# Patient Record
Sex: FEMALE | Race: BLACK OR AFRICAN AMERICAN | URBAN - METROPOLITAN AREA
[De-identification: names, ages, dates, MRNs, and addresses within clinical notes are randomized per-mention and may not be internally consistent; named-entity substitution may affect disease eponyms.]

---

## 2018-09-14 ENCOUNTER — OFFICE VISIT (OUTPATIENT)
Dept: NEUROLOGY | Age: 23
End: 2018-09-14

## 2018-09-14 VITALS
RESPIRATION RATE: 12 BRPM | BODY MASS INDEX: 24.52 KG/M2 | SYSTOLIC BLOOD PRESSURE: 104 MMHG | OXYGEN SATURATION: 100 % | HEART RATE: 77 BPM | TEMPERATURE: 98.2 F | WEIGHT: 138.4 LBS | DIASTOLIC BLOOD PRESSURE: 74 MMHG | HEIGHT: 63 IN

## 2018-09-14 DIAGNOSIS — G44.039 EPISODIC PAROXYSMAL HEMICRANIA, NOT INTRACTABLE: Primary | ICD-10-CM

## 2018-09-14 DIAGNOSIS — M79.2 NEURALGIA: ICD-10-CM

## 2018-09-14 RX ORDER — OXCARBAZEPINE 300 MG/1
300 TABLET, FILM COATED ORAL 2 TIMES DAILY
Qty: 60 TAB | Refills: 3 | Status: SHIPPED | OUTPATIENT
Start: 2018-09-14

## 2018-09-14 NOTE — PROGRESS NOTES
Eloisa Johnson is a 23 y.o. female Chief Complaint Patient presents with  New Patient Pt seen at the request by Dr. Melany Ceballos  Headache Pt stated that it't mostly on the left side. Pt also stated that it comes and go. 1. Have you been to the ER, urgent care clinic since your last visit? Hospitalized since your last visit? No 
 
2. Have you seen or consulted any other health care providers outside of the [facility] since your last visit? Include any pap smears or colon screening.  No

## 2018-09-14 NOTE — MR AVS SNAPSHOT
[address] [facility] [id number] [phone] Patient: Ricardo Velez MRN: [id number] :1995 Visit Information Date & Time Provider Department Dept. Phone Encounter #  
 2018 10:00 AM [facility] [phone] [id number] Follow-up Instructions Return in about 7 weeks (around 2018). Upcoming Health Maintenance Date Due  
 HPV Age 9Y-34Y (1 of 1 - Female 3 Dose Series) 2004 DTaP/Tdap/Td series (1 - Tdap) 2014 PAP AKA CERVICAL CYTOLOGY 2014 Influenza Age 5 to Adult 2018 Allergies as of 2018  Review Complete On: 2018 By: Moisés Perez Not on File Current Immunizations  Never Reviewed No immunizations on file. Not reviewed this visit Vitals BP Pulse Temp Resp Height(growth percentile) Weight(growth percentile) 104/74 (BP 1 Location: Left arm, BP Patient Position: Sitting) 77 98.2 °F (36.8 °C) (Oral) 12 5' 3\" (1.6 m) 138 lb 6.4 oz (62.8 kg) LMP SpO2 BMI OB Status Smoking Status 2018 100% 24.52 kg/m2 Having regular periods Never Smoker Vitals History BMI and BSA Data Body Mass Index Body Surface Area 24.52 kg/m 2 1.67 m 2 Preferred Pharmacy Pharmacy Name Phone [facility] [address] [place] [address], [address] [place] [address] [phone] Your Updated Medication List  
  
   
This list is accurate as of 18 10:55 AM.  Always use your most recent med list. OXcarbazepine 300 mg tablet Commonly known as:  TRILEPTAL Take 1 Tab by mouth two (2) times a day. Take one tablet daily at bedtime for two days, and then take one tablet two times per day. Prescriptions Sent to Pharmacy Refills  OXcarbazepine (TRILEPTAL) 300 mg tablet 3  
 Sig: Take 1 Tab by mouth two (2) times a day. Take one tablet daily at bedtime for two days, and then take one tablet two times per day. Class: Normal  
 Pharmacy: [facility], [address] [place] [address] #: [phone] Route: Oral  
  
Follow-up Instructions Return in about 7 weeks (around 11/2/2018). Introducing [facility]! [facility] introduces [employer]Star patient portal. Now you can access parts of your medical record, email your doctor's office, and request medication refills online. 1. In your internet browser, go to [email or web address] 2. Click on the First Time User? Click Here link in the Sign In box. You will see the New Member Sign Up page. 3. Enter your StreamStar Access Code exactly as it appears below. You will not need to use this code after youve completed the sign-up process. If you do not sign up before the expiration date, you must request a new code. · StreamStar Access Code: [email or web address] Expires: 12/13/2018  9:43 AM 
 
4. Enter the last four digits of your Social Security Number (xxxx) and Date of Birth (mm/dd/yyyy) as indicated and click Submit. You will be taken to the next sign-up page. 5. Create a StreamStar ID. This will be your StreamStar login ID and cannot be changed, so think of one that is secure and easy to remember. 6. Create a StreamStar password. You can change your password at any time. 7. Enter your Password Reset Question and Answer. This can be used at a later time if you forget your password. 8. Enter your e-mail address. You will receive e-mail notification when new information is available in [address] 9. Click Sign Up. You can now view and download portions of your medical record. 10. Click the Download Summary menu link to download a portable copy of your medical information.  
 
If you have questions, please visit the Frequently Asked Questions section of the "[employer]". Remember, [employer] is NOT to be used for urgent needs. For medical emergencies, dial 911. Now available from your iPhone and Android! Please provide this summary of care documentation to your next provider. Your primary care clinician is listed as Cyndi Farrell. If you have any questions after today's visit, please call [phone].

## 2018-09-14 NOTE — PROGRESS NOTES
Ken We-07-A [address]  
[address] [facility] [facility] [address]  [phone] Fax Referring:  Dr. Maynor Astorga Chief Complaint Patient presents with  New Patient Pt seen at the request by Dr. Maynor Astorga  Headache Pt stated that it't mostly on the left side. Pt also stated that it comes and go. CC-  Patient presents for evaluation of intermittent headaches for the past 9 years. She is accompanied by her sister. HPI- Patient reports she started in about 2009 with intermittent headaches. At first, her headaches were about 6 times per year and lasted 5 seconds each. Her headaches have gradually increased to about 3 times per month and last 5-15 seconds each  Her headaches are usually on the left side of her head (parietal area), and rarely (about 2 times per year) they occur on the right side of her head again in the parietal area. Her headaches are localized and do not spread. They feel like a lightening bolt 10/10 for about 3 seconds,and then she feels a mild aching pain in the same location (she indicates an area about 4 cm diameter) for the next 5-15 seconds. She will occasionally feel mild nausea while her head hurts. About one year after she started having these headaches, she noticed a tender area on the top of her head about 3 cm diameter, which is still very tender to touch. She denies any vomiting, photophobia, phonophobia, vision changes, or aura. She denies any tingling or weakness. She denies any twitching, jerking, dizziness, vertigo, fainting or altered consciousness. She denies any difficulty swallowing or speaking. Since 2016, she feels intermittent numbness inside the left side of her head (same parietal area as where her head pain occurs), this occurs once per week and lasts about 4 hours.  She denies feeling any paresthesias on her scalp. When she has the head pain, it is a little difficult for her to concentrate for the duration of the pain (5-15 seconds), but denies any actual confusion during or after her headaches. During her headaches, she can speak and respond normally. She has no incontinence of urine or stool. She has no tearing or redness of her eyes or rhinorrhea. PMH-   At 1years of age, she hit the top of her head on a faucet. She had no skull fracture or head injury, but she states that since then, she has a small dent in her skull where the faucet hit her head. The tender area on her scalp (noted above) is anterior to this dent. She denies any history of concussions or other head injuries. She denies any neck or back injuries. She has a  microadenoma that was diagnosed in 6/18. It was seen on a brain MRI which was ordered by her PCP. She has a history of an elevated prolactin level, it was 34 in 5/18 and 45 in 6/18 (range is 4.8 - 23). Her elevated prolactin level was found on routine blood work, which was done due to she had irregular and heavy menses. She denies any nipple discharge. She also has history of large fibroids and she had a uterine myomectomy in 7/18. She has been under the care of an endocrinologist since 5/18, and her next appointment is in 12/18. She states her thyroid levels have been checked and are normal. She denies any rashes, tick bites, fevers or joint or muscle pain. She denies any back or neck pain. She had her wisdom teeth removed, but it did not help her head pain. She denies any tooth pain. She denies anxiety, depression or personality changes. She denies any other chronic illnesses. She has regular eye exams, and her last exam in  6/18 was normal.  She denies any visual field deficits or vision changes. Social and Personal History - She llives with her parents. 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  She denies smoking, drinking alcohol or drug use. She states she is not pregnant,  and she is not trying to get pregnant. She states she is not sexually active. Past Medical History:  
Diagnosis Date  Headache Past Surgical History:  
Procedure Laterality Date [address] [place] UNLISTED Current Outpatient Prescriptions Medication Sig Dispense Refill  OXcarbazepine (TRILEPTAL) 300 mg tablet Take 1 Tab by mouth two (2) times a day. Take one tablet daily at bedtime for two days, and then take one tablet two times per day. 60 Tab 3 No Known Allergies Social History Substance Use Topics  Smoking status: Never Smoker  Smokeless tobacco: Never Used  Alcohol use No  
 
 
Family History Problem Relation Age of Onset  No Known Problems Mother  No Known Problems Father  No Known Problems Sister Review of Systems- complete review of systems negative except for she currently feels a mild numbness in her left parietal area-her skin feels normal, the numb feeling is inside her head. She denies any current acute illness or headache. Specifically she denies fatigue, fever, weight loss, dizziness, vertigo, vision changes, hearing loss, tinnitus, ear pain, nasal congestion, sinus pain or pressure, throat pain, tooth pain, neck pain, swollen glands, cough, shortness of breath, chest pain or tightness, palpitations, nausea, vomiting, diarrhea, abdominal pain, dysuria or urinary frequency or urgency, extremity or back pain, muscle or joint pain, numbness, tingling, weakness, tremors, memory problems, personality change, depressive symptoms, anxiety, bruising or bleeding, excessive sweating, or excessive thirst or hunger. Examination Visit Vitals  /74 (BP 1 Location: Left arm, BP Patient Position: Sitting)  Pulse 77  Temp 98.2 °F (36.8 °C) (Oral)  Resp 12  Ht 5' 3\" (1.6 m)  Wt 62.8 kg (138 lb 6.4 oz)  LMP 09/01/2018  SpO2 100%  BMI 24.52 kg/m2 Physical Exam-  
 
 General - Patient alert in NAD, well nourished and well groomed. Color normal. Vital signs stable. HEENT- She has tenderness to palpation on the top of her scalp- central aspect area about 3 cm diameter, no papules or nodule palpated, no erythema or rash,  no eye tearing or eyelid swelling, nose and throat are clear, sinuses are nontender to palpation. Neck- FROM without pain. Neck is nontender to palpation. No adenopathy. No carotid bruit. Chest - CTA A/P/L, full breath sounds bilaterally. Heart - RRR, no murmur Abdomen- not distended Musculoskeletal- normal posture, FROM of joints Extremities - warm and no edema. Skin- no rashes or lesions. Psychiatric- normal mood and bright affect. Neurological- Alert and oriented to person, place and time. Speech is clear- no aphasia or dysarthria. Recent and remote memory appear intact. Cranial nerves II-XII are intact- visual acuity and visual fields are grossly intact, Fundoscopic- no papilledema, PERRLA, EOMS intact, no nystagmus or ptosis, facial sensation normal and masseter strength intact, no facial asymmetry, facial movements are symmetrical and normal strength, hearing grossly intact, palate rises symmetrically, normal gag reflex, sternocleidomastoid and trapezius strength 5/5 bilaterally, tongue midline. Motor System- strength 5/5 to upper and lower extremities bilaterally, normal muscle bulk and tone, no tremor. Gait- normal and steady; toe, heel and tandem gait intact. Coordination - finger to nose accurate, EVELIA intact, Romberg negative, no pronator drift. Sensory- intact throughout to light touch and temperature Reflexes- 2+ to upper and lower extremities bilaterally. Impression-  Paroxysmal Headache Syndrome Plan - 
 
 Dr. Awa Jimenez in to speak with patient, confirm exam findings and  discuss treatment options with patient.   Patient stated she does want to try a daily preventative medication for her headaches. She was prescribed Trileptal 300 mg tablet - take one tablet daily at bedtime for two days, and then take one tablet twice daily. The potential side effects of the medication were reviewed with the patient. Patient advised to follow up in 7 weeks, or sooner as needed. Note- patient signed a release of medical information so that we can obtain her medical records including Brain MRI report. Sarahi Dave, [facility] I have reviewed the documentation provided by the nurse practitioner, . Krissy Hoffman, and we have discussed her findings and the clinical impression. I have formulated with her the proposed management plans for this patient. Additionally,  I have personally evaluated the patient to verify the history and to confirm physical findings. Below are my additional comments: 
Pleasant 69-year-old woman with dysesthesia in the left parietal region. She does have some tenderness and otherwise her examination is completely nonfocal.  General examination also completely normal.  We discussed that this discomfort she is feeling would be classed as a neuralgic type perhaps a branch of the occipital nerve versus other. Discussed options such as doing nothing versus trying medication. She like to try medication. We therefore will proceed with Trileptal as noted above. We did discuss potential side effects potential benefits as well as alternatives. She will return in about 7 weeks or so and the expectation was that that she may not get full relief for full benefit from the medication for 6-8 weeks. Cristine Gauthier MD 
This note will not be viewable in [address]

## 2020-11-02 ENCOUNTER — APPOINTMENT (OUTPATIENT)
Age: 25
Setting detail: DERMATOLOGY
End: 2020-11-05

## 2020-11-02 DIAGNOSIS — L65.8 OTHER SPECIFIED NONSCARRING HAIR LOSS: ICD-10-CM

## 2020-11-02 DIAGNOSIS — L85.1 ACQUIRED KERATOSIS [KERATODERMA] PALMARIS ET PLANTARIS: ICD-10-CM

## 2020-11-02 PROBLEM — D23.62 OTHER BENIGN NEOPLASM OF SKIN OF LEFT UPPER LIMB, INCLUDING SHOULDER: Status: ACTIVE | Noted: 2020-11-02

## 2020-11-02 PROBLEM — L65.9 NONSCARRING HAIR LOSS, UNSPECIFIED: Status: ACTIVE | Noted: 2020-11-02

## 2020-11-02 PROBLEM — D23.61 OTHER BENIGN NEOPLASM OF SKIN OF RIGHT UPPER LIMB, INCLUDING SHOULDER: Status: ACTIVE | Noted: 2020-11-02

## 2020-11-02 PROCEDURE — OTHER COUNSELING: OTHER

## 2020-11-02 PROCEDURE — OTHER MIPS QUALITY: OTHER

## 2020-11-02 PROCEDURE — OTHER REASSURANCE: OTHER

## 2020-11-02 PROCEDURE — OTHER TREATMENT REGIMEN: OTHER

## 2020-11-02 PROCEDURE — OTHER PRESCRIPTION: OTHER

## 2020-11-02 PROCEDURE — OTHER MEDICATION COUNSELING: OTHER

## 2020-11-02 PROCEDURE — 99203 OFFICE O/P NEW LOW 30 MIN: CPT

## 2020-11-02 RX ORDER — CLOBETASOL PROPIONATE 0.5 MG/G
OINTMENT TOPICAL
Qty: 1 | Refills: 2 | Status: ERX | COMMUNITY
Start: 2020-11-02

## 2020-11-02 RX ORDER — DOXYCYCLINE HYCLATE 50 MG/1
CAPSULE, GELATIN COATED ORAL
Qty: 90 | Refills: 1 | Status: ERX | COMMUNITY
Start: 2020-11-02

## 2020-11-02 ASSESSMENT — LOCATION SIMPLE DESCRIPTION DERM
LOCATION SIMPLE: ANTERIOR SCALP
LOCATION SIMPLE: RIGHT HAND

## 2020-11-02 ASSESSMENT — LOCATION ZONE DERM
LOCATION ZONE: SCALP
LOCATION ZONE: HAND

## 2020-11-02 ASSESSMENT — LOCATION DETAILED DESCRIPTION DERM
LOCATION DETAILED: RIGHT THENAR EMINENCE
LOCATION DETAILED: MID-FRONTAL SCALP

## 2020-11-02 NOTE — PROCEDURE: MIPS QUALITY
Name And Contact Information For Health Care Proxy: Sharyn Watkins sister [phone] Name And Contact Information For Health Care Proxy: Sharyn Watkins sister [phone]

## 2020-11-02 NOTE — PROCEDURE: MEDICATION COUNSELING
Xelphoebez Pregnancy And Lactation Text: This medication is Pregnancy Category D and is not considered safe during pregnancy.  The risk during breast feeding is also uncertain.

## 2020-11-02 NOTE — PROCEDURE: TREATMENT REGIMEN
Detail Level: Simple
Initiate Treatment: Clobetasol oint x fri-sun
Plan: B/d loosening plats. Expresses no pain or itching symptoms - only when touched

## 2022-06-09 NOTE — PROCEDURE: MEDICATION COUNSELING
Is This A New Presentation, Or A Follow-Up?: Skin Lesion Griseofulvin Counseling:  I discussed with the patient the risks of griseofulvin including but not limited to photosensitivity, cytopenia, liver damage, nausea/vomiting and severe allergy.  The patient understands that this medication is best absorbed when taken with a fatty meal (e.g., ice cream or french fries).

## 2023-02-15 ENCOUNTER — APPOINTMENT (RX ONLY)
Dept: URBAN - METROPOLITAN AREA CLINIC 41 | Facility: CLINIC | Age: 28
Setting detail: DERMATOLOGY
End: 2023-02-15

## 2023-02-15 DIAGNOSIS — L42 PITYRIASIS ROSEA: ICD-10-CM | Status: INADEQUATELY CONTROLLED

## 2023-02-15 PROBLEM — L30.9 DERMATITIS, UNSPECIFIED: Status: ACTIVE | Noted: 2023-02-15

## 2023-02-15 PROCEDURE — 11102 TANGNTL BX SKIN SINGLE LES: CPT

## 2023-02-15 PROCEDURE — ? ADDITIONAL NOTES

## 2023-02-15 PROCEDURE — ? PRESCRIPTION MEDICATION MANAGEMENT

## 2023-02-15 PROCEDURE — ? COUNSELING

## 2023-02-15 PROCEDURE — ? PRESCRIPTION

## 2023-02-15 PROCEDURE — ? BIOPSY BY SHAVE METHOD

## 2023-02-15 RX ORDER — TRIAMCINOLONE ACETONIDE 1 MG/G
CREAM TOPICAL BID
Qty: 454 | Refills: 0 | Status: ERX | COMMUNITY
Start: 2023-02-15

## 2023-02-15 RX ADMIN — TRIAMCINOLONE ACETONIDE: 1 CREAM TOPICAL at 00:00

## 2023-02-15 ASSESSMENT — LOCATION SIMPLE DESCRIPTION DERM: LOCATION SIMPLE: RIGHT UPPER BACK

## 2023-02-15 ASSESSMENT — LOCATION ZONE DERM: LOCATION ZONE: TRUNK

## 2023-02-15 ASSESSMENT — LOCATION DETAILED DESCRIPTION DERM: LOCATION DETAILED: RIGHT MEDIAL UPPER BACK

## 2023-02-15 NOTE — HPI: RASH
Is This A New Presentation, Or A Follow-Up?: Rash
Additional History: NPT presenting with a rash on body for two weeks. Pt says they itch when scratched. Pt says itching is worse at night. Pt notes it started on the right arm and now spreading. Pt has not tried anything.

## 2023-02-15 NOTE — PROCEDURE: BIOPSY BY SHAVE METHOD
Detail Level: Detailed
Depth Of Biopsy: dermis
Was A Bandage Applied: Yes
Size Of Lesion In Cm: 0
Biopsy Type: H and E
Biopsy Method: Dermablade
Anesthesia Type: 1% lidocaine with epinephrine
Anesthesia Volume In Cc (Will Not Render If 0): 0.5
Hemostasis: Aluminum Chloride
Wound Care: Vaseline
Dressing: Band-Aid
Destruction After The Procedure: No
Type Of Destruction Used: Curettage
Curettage Text: The wound bed was treated with curettage after the biopsy was performed.
Cryotherapy Text: The wound bed was treated with cryotherapy after the biopsy was performed.
Electrodesiccation Text: The wound bed was treated with electrodesiccation after the biopsy was performed.
Electrodesiccation And Curettage Text: The wound bed was treated with electrodesiccation and curettage after the biopsy was performed.
Silver Nitrate Text: The wound bed was treated with silver nitrate after the biopsy was performed.
Path Notes (To The Dermatopathologist): No previous path
Consent: Written consent was obtained and risks were reviewed including but not limited to scarring, infection, bleeding, scabbing, incomplete removal, nerve damage and allergy to anesthesia.
Post-Care Instructions: I reviewed with the patient in detail post-care instructions. Patient is to keep the biopsy site dry overnight, and then apply bacitracin twice daily until healed. Patient may apply hydrogen peroxide soaks to remove any crusting.
Notification Instructions: Patient will be notified of biopsy results. However, patient instructed to call the office if not contacted within 2 weeks.
Billing Type: Third-Party Bill
Information: Selecting Yes will display possible errors in your note based on the variables you have selected. This validation is only offered as a suggestion for you. PLEASE NOTE THAT THE VALIDATION TEXT WILL BE REMOVED WHEN YOU FINALIZE YOUR NOTE. IF YOU WANT TO FAX A PRELIMINARY NOTE YOU WILL NEED TO TOGGLE THIS TO 'NO' IF YOU DO NOT WANT IT IN YOUR FAXED NOTE.

## 2023-02-15 NOTE — PROCEDURE: PRESCRIPTION MEDICATION MANAGEMENT
Render In Strict Bullet Format?: No
Detail Level: Zone
Initiate Treatment: Triamcinolone .1% cream bid x two weeks

## 2023-02-15 NOTE — PROCEDURE: COUNSELING
Detail Level: Detailed
Patient Specific Counseling (Will Not Stick From Patient To Patient): —\\nSp favors pityriasis rosea and counsels on dx. Pt denies new medications or recent illness. Pt denies risk of STDs. No rash on palms or soles.  SP also counsels on biopsy if no improvement. Pt would like to do biopsy today.

## 2023-03-01 ENCOUNTER — APPOINTMENT (RX ONLY)
Dept: URBAN - METROPOLITAN AREA CLINIC 41 | Facility: CLINIC | Age: 28
Setting detail: DERMATOLOGY
End: 2023-03-01

## 2023-03-01 DIAGNOSIS — L42 PITYRIASIS ROSEA: ICD-10-CM | Status: RESOLVING

## 2023-03-01 PROCEDURE — ? COUNSELING

## 2023-03-01 PROCEDURE — ? ADDITIONAL NOTES

## 2023-03-01 PROCEDURE — 99213 OFFICE O/P EST LOW 20 MIN: CPT

## 2023-03-01 PROCEDURE — ? PRESCRIPTION MEDICATION MANAGEMENT

## 2023-03-01 NOTE — PROCEDURE: PRESCRIPTION MEDICATION MANAGEMENT
Discontinue Regimen: Triamcinolone .1% cream bid x two weeks
Render In Strict Bullet Format?: No
Detail Level: Zone

## 2023-03-01 NOTE — PROCEDURE: ADDITIONAL NOTES
Additional Notes: Patient consent was obtained to proceed with the visit and recommended plan of care after discussion of all risks and benefits, including the risks of COVID-19 exposure.
Detail Level: Simple
Render Risk Assessment In Note?: no
Additional Notes: Sp reviews path report.  Sp notes hyperpigmentation should fade over time and pt can discontinue rx. Sp notes pt should monitor for rash coming back again and pt should rto in this case.